# Patient Record
Sex: FEMALE | Race: WHITE | Employment: UNEMPLOYED | URBAN - METROPOLITAN AREA
[De-identification: names, ages, dates, MRNs, and addresses within clinical notes are randomized per-mention and may not be internally consistent; named-entity substitution may affect disease eponyms.]

---

## 2017-01-19 DIAGNOSIS — Z12.31 VISIT FOR SCREENING MAMMOGRAM: Primary | ICD-10-CM

## 2017-06-05 ENCOUNTER — OFFICE VISIT (OUTPATIENT)
Dept: INTERNAL MEDICINE | Age: 57
End: 2017-06-05

## 2017-06-05 VITALS
DIASTOLIC BLOOD PRESSURE: 82 MMHG | BODY MASS INDEX: 17.24 KG/M2 | HEIGHT: 60 IN | WEIGHT: 87.8 LBS | HEART RATE: 82 BPM | SYSTOLIC BLOOD PRESSURE: 198 MMHG

## 2017-06-05 DIAGNOSIS — I10 ESSENTIAL HYPERTENSION: ICD-10-CM

## 2017-06-05 PROCEDURE — 99213 OFFICE O/P EST LOW 20 MIN: CPT | Performed by: FAMILY MEDICINE

## 2017-06-05 RX ORDER — LISINOPRIL 20 MG/1
20 TABLET ORAL DAILY
Qty: 90 TABLET | Refills: 3 | Status: SHIPPED | OUTPATIENT
Start: 2017-06-05 | End: 2018-06-06 | Stop reason: SDUPTHER

## 2017-06-05 RX ORDER — METOPROLOL TARTRATE 50 MG/1
50 TABLET, FILM COATED ORAL DAILY
Qty: 90 TABLET | Refills: 3 | Status: SHIPPED | OUTPATIENT
Start: 2017-06-05 | End: 2018-06-06 | Stop reason: SDUPTHER

## 2017-06-05 ASSESSMENT — ENCOUNTER SYMPTOMS
COUGH: 1
WHEEZING: 0
SHORTNESS OF BREATH: 1
BACK PAIN: 0
CHEST TIGHTNESS: 0

## 2018-06-06 ENCOUNTER — OFFICE VISIT (OUTPATIENT)
Dept: INTERNAL MEDICINE | Age: 58
End: 2018-06-06

## 2018-06-06 VITALS
HEART RATE: 70 BPM | WEIGHT: 110 LBS | SYSTOLIC BLOOD PRESSURE: 154 MMHG | BODY MASS INDEX: 21.6 KG/M2 | DIASTOLIC BLOOD PRESSURE: 70 MMHG | HEIGHT: 60 IN | OXYGEN SATURATION: 97 %

## 2018-06-06 DIAGNOSIS — I10 ESSENTIAL HYPERTENSION: ICD-10-CM

## 2018-06-06 PROCEDURE — 99213 OFFICE O/P EST LOW 20 MIN: CPT | Performed by: FAMILY MEDICINE

## 2018-06-06 RX ORDER — LISINOPRIL 20 MG/1
20 TABLET ORAL DAILY
Qty: 30 TABLET | Refills: 0 | Status: SHIPPED | OUTPATIENT
Start: 2018-06-06 | End: 2018-06-25 | Stop reason: SDUPTHER

## 2018-06-06 RX ORDER — METOPROLOL TARTRATE 50 MG/1
50 TABLET, FILM COATED ORAL DAILY
Qty: 30 TABLET | Refills: 0 | Status: SHIPPED | OUTPATIENT
Start: 2018-06-06 | End: 2018-06-25 | Stop reason: SDUPTHER

## 2018-06-06 ASSESSMENT — ENCOUNTER SYMPTOMS
STRIDOR: 0
EYE REDNESS: 0
COUGH: 0
SHORTNESS OF BREATH: 0
CHOKING: 0
PHOTOPHOBIA: 0
EYE PAIN: 0
WHEEZING: 0

## 2018-06-06 ASSESSMENT — PATIENT HEALTH QUESTIONNAIRE - PHQ9
SUM OF ALL RESPONSES TO PHQ9 QUESTIONS 1 & 2: 1
2. FEELING DOWN, DEPRESSED OR HOPELESS: 1
SUM OF ALL RESPONSES TO PHQ QUESTIONS 1-9: 1
1. LITTLE INTEREST OR PLEASURE IN DOING THINGS: 0

## 2018-06-21 LAB
ALBUMIN SERPL-MCNC: 4.3 G/DL
ALP BLD-CCNC: 76 U/L
ALT SERPL-CCNC: 10 U/L
ANION GAP SERPL CALCULATED.3IONS-SCNC: NORMAL MMOL/L
AST SERPL-CCNC: 16 U/L
AVERAGE GLUCOSE: NORMAL
BILIRUB SERPL-MCNC: 0.7 MG/DL (ref 0.1–1.4)
BUN BLDV-MCNC: 13 MG/DL
CALCIUM SERPL-MCNC: 10 MG/DL
CHLORIDE BLD-SCNC: 95 MMOL/L
CHOLESTEROL, TOTAL: 228 MG/DL
CHOLESTEROL/HDL RATIO: ABNORMAL
CO2: 26 MMOL/L
CREAT SERPL-MCNC: 0.78 MG/DL
GFR CALCULATED: >60
GLUCOSE BLD-MCNC: NORMAL MG/DL
HBA1C MFR BLD: 6 %
HDLC SERPL-MCNC: 115 MG/DL (ref 35–70)
LDL CHOLESTEROL CALCULATED: 102 MG/DL (ref 0–160)
POTASSIUM SERPL-SCNC: 5.4 MMOL/L
SODIUM BLD-SCNC: 136 MMOL/L
TOTAL PROTEIN: 7.4
TRIGL SERPL-MCNC: 57 MG/DL
TSH SERPL DL<=0.05 MIU/L-ACNC: 0.25 UIU/ML
VLDLC SERPL CALC-MCNC: ABNORMAL MG/DL

## 2018-06-25 DIAGNOSIS — I10 ESSENTIAL HYPERTENSION: ICD-10-CM

## 2018-06-25 RX ORDER — LISINOPRIL 30 MG/1
30 TABLET ORAL DAILY
Qty: 90 TABLET | Refills: 2 | Status: SHIPPED | OUTPATIENT
Start: 2018-06-25 | End: 2019-03-25 | Stop reason: SDUPTHER

## 2018-06-25 RX ORDER — METOPROLOL TARTRATE 50 MG/1
50 TABLET, FILM COATED ORAL DAILY
Qty: 90 TABLET | Refills: 3 | Status: SHIPPED | OUTPATIENT
Start: 2018-06-25 | End: 2019-05-06 | Stop reason: SDUPTHER

## 2019-03-23 LAB
ALBUMIN SERPL-MCNC: 4.4 G/DL (ref 3.5–4.6)
ALP BLD-CCNC: 68 U/L (ref 40–130)
ALT SERPL-CCNC: 10 U/L (ref 0–33)
ANION GAP SERPL CALCULATED.3IONS-SCNC: 14 MEQ/L (ref 9–15)
AST SERPL-CCNC: 17 U/L (ref 0–35)
BILIRUB SERPL-MCNC: 0.5 MG/DL (ref 0.2–0.7)
BUN BLDV-MCNC: 12 MG/DL (ref 6–20)
CALCIUM SERPL-MCNC: 9.8 MG/DL (ref 8.5–9.9)
CHLORIDE BLD-SCNC: 99 MEQ/L (ref 95–107)
CHOLESTEROL, TOTAL: 221 MG/DL (ref 0–199)
CO2: 22 MEQ/L (ref 20–31)
CREAT SERPL-MCNC: 0.78 MG/DL (ref 0.5–0.9)
GFR AFRICAN AMERICAN: >60
GFR NON-AFRICAN AMERICAN: >60
GLOBULIN: 3.2 G/DL (ref 2.3–3.5)
GLUCOSE BLD-MCNC: 106 MG/DL (ref 70–99)
HDLC SERPL-MCNC: 100 MG/DL (ref 40–59)
LDL CHOLESTEROL CALCULATED: 111 MG/DL (ref 0–129)
POTASSIUM SERPL-SCNC: 5.5 MEQ/L (ref 3.4–4.9)
SODIUM BLD-SCNC: 135 MEQ/L (ref 135–144)
TOTAL PROTEIN: 7.6 G/DL (ref 6.3–8)
TRIGL SERPL-MCNC: 51 MG/DL (ref 0–150)

## 2019-03-25 DIAGNOSIS — I10 ESSENTIAL HYPERTENSION: ICD-10-CM

## 2019-03-25 PROBLEM — R73.03 PREDIABETES: Status: ACTIVE | Noted: 2019-03-25

## 2019-03-26 RX ORDER — LISINOPRIL 30 MG/1
30 TABLET ORAL DAILY
Qty: 30 TABLET | Refills: 3 | Status: SHIPPED | OUTPATIENT
Start: 2019-03-26 | End: 2019-05-06 | Stop reason: SDUPTHER

## 2019-03-27 LAB
ALBUMIN SERPL-MCNC: 4.4 G/DL
ALP BLD-CCNC: 68 U/L
ALT SERPL-CCNC: 10 U/L
ANION GAP SERPL CALCULATED.3IONS-SCNC: 14 MMOL/L
AST SERPL-CCNC: 17 U/L
AVERAGE GLUCOSE: 106
BILIRUB SERPL-MCNC: 0.5 MG/DL (ref 0.1–1.4)
BUN BLDV-MCNC: 12 MG/DL
CALCIUM SERPL-MCNC: 9.8 MG/DL
CHLORIDE BLD-SCNC: 99 MMOL/L
CHOLESTEROL, TOTAL: 221 MG/DL
CHOLESTEROL/HDL RATIO: ABNORMAL
CO2: 22 MMOL/L
CREAT SERPL-MCNC: 0.78 MG/DL
GFR CALCULATED: 60
GLUCOSE BLD-MCNC: 106 MG/DL
GLUCOSE BLD-MCNC: 106 MG/DL
HBA1C MFR BLD: 5.8 %
HDLC SERPL-MCNC: 100 MG/DL (ref 35–70)
LDL CHOLESTEROL CALCULATED: 111 MG/DL (ref 0–160)
POTASSIUM SERPL-SCNC: 5.5 MMOL/L
SODIUM BLD-SCNC: 135 MMOL/L
TOTAL PROTEIN: 7.6
TRIGL SERPL-MCNC: 51 MG/DL
VLDLC SERPL CALC-MCNC: ABNORMAL MG/DL

## 2019-04-03 ENCOUNTER — TELEPHONE (OUTPATIENT)
Dept: INTERNAL MEDICINE | Age: 59
End: 2019-04-03

## 2019-05-06 ENCOUNTER — OFFICE VISIT (OUTPATIENT)
Dept: INTERNAL MEDICINE | Age: 59
End: 2019-05-06

## 2019-05-06 VITALS
SYSTOLIC BLOOD PRESSURE: 158 MMHG | HEART RATE: 65 BPM | WEIGHT: 115 LBS | DIASTOLIC BLOOD PRESSURE: 54 MMHG | BODY MASS INDEX: 22.46 KG/M2 | OXYGEN SATURATION: 98 %

## 2019-05-06 DIAGNOSIS — Z12.11 ENCOUNTER FOR SCREENING COLONOSCOPY: ICD-10-CM

## 2019-05-06 DIAGNOSIS — E78.5 HYPERLIPIDEMIA, UNSPECIFIED HYPERLIPIDEMIA TYPE: ICD-10-CM

## 2019-05-06 DIAGNOSIS — F17.219 CIGARETTE NICOTINE DEPENDENCE WITH NICOTINE-INDUCED DISORDER: ICD-10-CM

## 2019-05-06 DIAGNOSIS — R73.03 PREDIABETES: Primary | ICD-10-CM

## 2019-05-06 DIAGNOSIS — Z12.39 SCREENING FOR BREAST CANCER: ICD-10-CM

## 2019-05-06 DIAGNOSIS — I10 ESSENTIAL HYPERTENSION: ICD-10-CM

## 2019-05-06 PROCEDURE — 99214 OFFICE O/P EST MOD 30 MIN: CPT | Performed by: FAMILY MEDICINE

## 2019-05-06 RX ORDER — METOPROLOL TARTRATE 50 MG/1
50 TABLET, FILM COATED ORAL DAILY
Qty: 90 TABLET | Refills: 3 | Status: SHIPPED | OUTPATIENT
Start: 2019-05-06 | End: 2020-05-04 | Stop reason: SDUPTHER

## 2019-05-06 RX ORDER — LISINOPRIL 40 MG/1
40 TABLET ORAL DAILY
Qty: 90 TABLET | Refills: 3 | Status: SHIPPED | OUTPATIENT
Start: 2019-05-06 | End: 2020-05-04 | Stop reason: SDUPTHER

## 2019-05-06 ASSESSMENT — PATIENT HEALTH QUESTIONNAIRE - PHQ9
SUM OF ALL RESPONSES TO PHQ QUESTIONS 1-9: 1
SUM OF ALL RESPONSES TO PHQ9 QUESTIONS 1 & 2: 1
SUM OF ALL RESPONSES TO PHQ QUESTIONS 1-9: 1
2. FEELING DOWN, DEPRESSED OR HOPELESS: 1
1. LITTLE INTEREST OR PLEASURE IN DOING THINGS: 0

## 2019-05-06 ASSESSMENT — ENCOUNTER SYMPTOMS
WHEEZING: 0
STRIDOR: 0
CHOKING: 0
EYE REDNESS: 0
SHORTNESS OF BREATH: 0
PHOTOPHOBIA: 0
COUGH: 0
EYE PAIN: 0

## 2019-05-06 NOTE — PROGRESS NOTES
Patient: Petar Jimenez    YOB: 1960    Date:5/6/19    Patient Active Problem List    Diagnosis Date Noted    Prediabetes 03/25/2019    HTN (hypertension) 03/04/2012    Hyperlipemia 03/04/2012    Nicotine dependence 03/04/2012     Past Medical History:   Diagnosis Date    Hypertension      No past surgical history on file. Social History     Socioeconomic History    Marital status:      Spouse name: Not on file    Number of children: Not on file    Years of education: Not on file    Highest education level: Not on file   Occupational History    Not on file   Social Needs    Financial resource strain: Not on file    Food insecurity:     Worry: Not on file     Inability: Not on file    Transportation needs:     Medical: Not on file     Non-medical: Not on file   Tobacco Use    Smoking status: Current Every Day Smoker     Packs/day: 0.75     Years: 30.00     Pack years: 22.50     Types: Cigarettes    Smokeless tobacco: Never Used   Substance and Sexual Activity    Alcohol use: No     Alcohol/week: 0.0 oz    Drug use: No    Sexual activity: Never   Lifestyle    Physical activity:     Days per week: Not on file     Minutes per session: Not on file    Stress: Not on file   Relationships    Social connections:     Talks on phone: Not on file     Gets together: Not on file     Attends Worship service: Not on file     Active member of club or organization: Not on file     Attends meetings of clubs or organizations: Not on file     Relationship status: Not on file    Intimate partner violence:     Fear of current or ex partner: Not on file     Emotionally abused: Not on file     Physically abused: Not on file     Forced sexual activity: Not on file   Other Topics Concern    Not on file   Social History Narrative    Not on file     No family history on file. No current outpatient medications on file prior to visit.      No current facility-administered medications on file prior to visit. No Known Allergies    Chief Complaint   Patient presents with    Hypertension     follow up    Hyperlipidemia       HPI:  Prediabetes  Lab Results   Component Value Date    LABA1C 5.8 03/27/2019     No results found for: EAG    Hypertension:  Home blood pressure monitoring: Yes - at times high. She is adherent to a low sodium diet. Patient denies chest pain and shortness of breath. Antihypertensive medication side effects: no medication side effects noted. Use of agents associated with hypertension: none. She is not taking her lisinopril. Lab Results   Component Value Date    LABA1C 5.8 03/27/2019    LABA1C 5.8 03/23/2019    LABA1C 6.0 (H) 06/10/2018     Lab Results   Component Value Date    LABMICR <1.20 06/16/2016    CREATININE 0.78 03/27/2019     Lab Results   Component Value Date    ALT 10 03/27/2019    AST 17 03/27/2019     Lab Results   Component Value Date    CHOL 221 03/27/2019    TRIG 51 03/27/2019     (A) 03/27/2019    LDLCALC 111 03/27/2019        Diabetes and Hypertension Visit Information    BP Readings from Last 3 Encounters:   05/06/19 (!) 158/54   06/06/18 (!) 154/70   06/05/17 (!) 198/82          Hemoglobin A1C (%)   Date Value   03/27/2019 5.8   03/23/2019 5.8   06/10/2018 6.0 (H)     Microalbumin, Random Urine (mg/dL)   Date Value   06/16/2016 <1.20     LDL Calculated (mg/dL)   Date Value   03/27/2019 111     HDL (mg/dL)   Date Value   03/27/2019 100 (A)     BUN (mg/dL)   Date Value   03/27/2019 12     CREATININE (no units)   Date Value   03/27/2019 0.78     Glucose (mg/dL)   Date Value   03/27/2019 106   03/27/2019 106              Have you seen any other physician or provider since your last visit? yes  Have you had any other diagnostic tests since your last visit? yes  Have you been seen in the emergency room and/or had an admission to a hospital since we last saw you?  No      Patient Care Team:  Olga Mcfarland MD as PCP - General (Family Medicine) Health Maintenance   Topic Date Due    Hepatitis C screen  1960    HIV screen  03/19/1975    DTaP/Tdap/Td vaccine (1 - Tdap) 03/19/1979    Cervical cancer screen  09/21/1999    Colon cancer screen colonoscopy  03/19/2010    Breast cancer screen  06/20/2014    Shingles Vaccine (1 of 2) 05/06/2020 (Originally 3/19/2010)    Flu vaccine (Season Ended) 09/01/2019    A1C test (Diabetic or Prediabetic)  03/27/2020    Potassium monitoring  03/27/2020    Creatinine monitoring  03/27/2020    Lipid screen  03/27/2024    Pneumococcal 0-64 years Vaccine  Completed         Review of Systems   Constitutional: Negative for diaphoresis, fatigue, fever and unexpected weight change. Eyes: Negative for photophobia, pain, redness and visual disturbance. Respiratory: Negative for cough, choking, shortness of breath, wheezing and stridor. Cardiovascular: Negative for chest pain, palpitations and leg swelling. Physical Exam  Vitals:    05/06/19 1451   BP: (!) 158/54   Pulse:    SpO2:    Body mass index is 22.46 kg/m². Physical Exam  Constitutional:  Appears well-developed and well-nourished. No distress. HENT:   Head: Normocephalic and atraumatic. Right Ear: External ear normal.   Left Ear: External ear normal.   Nose: Nose normal.   Eyes: Conjunctivae and EOM are normal.  Right eye exhibits no discharge. Left eye exhibits no discharge. No scleral icterus. Neck: Normal range of motion. Cardiovascular: Normal rate, regular rhythm and normal heart sounds. No murmur heard. Pulmonary/Chest: Effort normal and breath sounds normal. No respiratory distress. no wheezes. no rales. no tenderness. Musculoskeletal: Normal range of motion. Neurological: alert. Skin: not diaphoretic. Psychiatric: normal mood and affect. behavior is normal. Judgment and thought content normal.   Nursing note and vitals reviewed. Assessment:  Alena Lees was seen today for hypertension and hyperlipidemia.     Diagnoses and all orders for this visit:    Prediabetes  Stable    Essential hypertension  -     lisinopril (PRINIVIL;ZESTRIL) 40 MG tablet; Take 1 tablet by mouth daily  -     metoprolol tartrate (LOPRESSOR) 50 MG tablet; Take 1 tablet by mouth daily  Increase dose of ACE  Keep BP log at home    Cigarette nicotine dependence with nicotine-induced disorder  I advised she stop smoking and counseled her on the bad effects of tobacco     Hyperlipidemia, unspecified hyperlipidemia type  Stable, controlled  Plan: continue current medications    Screening for breast cancer  -     [facility] DIGITAL SCREEN W OR WO CAD BILATERAL; Future    Encounter for screening colonoscopy  -     COLOGUARD; Future    She does not have insurance and can not afford all testing and preventative care measures  Dicussed cost and pt assistance for mammogram and colon cancer screening  Labs with [facility] nursing     I personally reviewed all recent labs and imaging pertaining to conditions mentioned above in assessment/plan in [facility] and care everywhere, discussed results with patient in office    HTN / Hyperlipidemia Counseling  Patient was counseled regarding disease risks and adopting healthy behaviors. Patient was provided education materials (or meal plan) to assist with self management. Patient was provided log (or received log during previous visit) to record blood pressure and/or food intake. Patient was instructed to keep log up-to-date and to always bring log to all office visits. Reviewed the following concerns and recommendations with the patient:    Lifestyle modifications in the management of hypertension:  1. Weight reduction    -Maintain normal body weight (BMI, 18.5 to 24.9 kg/m2)  2. Adopt DASH eating plan    - Consume a diet rich in fruits, vegetables, and low-fat dairy products with a  reduced content of saturated and total fat   3.  Dietary sodium reduction    - Reduce dietary sodium intake to no more than 100 meq/day (2.4 g sodium or 6  g sodium chloride)   4. Physical activity    - Engage in regular aerobic physical activity such as brisk walking (at least 30  minutes per day, most days of the week)   5. Moderation of alcohol consumption    - Limit consumption to no more than 2 drinks per day in most men and no more  than 1 drink per day in women and lighter-weight persons     Return in about 1 year (around 5/6/2020).

## 2019-05-14 ENCOUNTER — HOSPITAL ENCOUNTER (OUTPATIENT)
Dept: WOMENS IMAGING | Age: 59
Discharge: HOME | End: 2019-05-16
Payer: COMMERCIAL

## 2019-05-14 DIAGNOSIS — Z12.39 SCREENING FOR BREAST CANCER: ICD-10-CM

## 2019-05-14 PROCEDURE — 77067 SCR MAMMO BI INCL CAD: CPT

## 2020-05-04 ENCOUNTER — VIRTUAL VISIT (OUTPATIENT)
Dept: INTERNAL MEDICINE | Age: 60
End: 2020-05-04

## 2020-05-04 VITALS
HEIGHT: 60 IN | WEIGHT: 115 LBS | DIASTOLIC BLOOD PRESSURE: 78 MMHG | HEART RATE: 70 BPM | SYSTOLIC BLOOD PRESSURE: 135 MMHG | BODY MASS INDEX: 22.58 KG/M2

## 2020-05-04 PROCEDURE — 99442 PR PHYS/QHP TELEPHONE EVALUATION 11-20 MIN: CPT | Performed by: FAMILY MEDICINE

## 2020-05-04 RX ORDER — LISINOPRIL 40 MG/1
40 TABLET ORAL DAILY
Qty: 90 TABLET | Refills: 2 | Status: SHIPPED | OUTPATIENT
Start: 2020-05-04

## 2020-05-04 RX ORDER — METOPROLOL TARTRATE 50 MG/1
50 TABLET, FILM COATED ORAL DAILY
Qty: 90 TABLET | Refills: 2 | Status: SHIPPED | OUTPATIENT
Start: 2020-05-04

## 2020-05-04 ASSESSMENT — ENCOUNTER SYMPTOMS
PHOTOPHOBIA: 0
EYE PAIN: 0
COUGH: 0
WHEEZING: 0
STRIDOR: 0
CHOKING: 0
EYE REDNESS: 0
SHORTNESS OF BREATH: 0

## 2020-05-04 NOTE — PATIENT INSTRUCTIONS
Hypertension / Hyperlipidemia Management    Blood Pressure Log      Tips to manage your condition    1. Keep your blood pressure below 130/80   Make sure your blood pressure is measured at every office visit    2. If you take antihypertensive drug therapy return for follow-up monthly until B/P goal is reached. 3. Follow-up with your physician to have your potassium and creatinine measured every 6 months. 4. Measure your blood pressure at home (use log to track your progress). 5. Keep your LDL (bad) cholesterol level below 130   Make sure your lipids are measured once a year     6. If you take LDL-lowering drug therapy return for follow-up every 6 months    Tips for healthy living    1. Start your day with breakfast  2. Exercise and slowly progress to (brisk walking, bike riding, etc) 30 minutes 3 to 5 days a week. 3. Snack in moderation (limit eating sugary or salty foods to no more than 3 times a week). 4. Eat more grains and vegetables (have no more than 3 servings of fruit daily). 5. Avoid tobacco use. 6. Drink alcohol in moderation (no more than 1 serving daily for woman and no more than 2 servings daily for men)  7. Obtain annual flu shot  8. Refer to patient education handouts given to you today. [facility] Address Phone Website   [facility]  [address] [place] [address] [phone] [email or web address] [email or web address]       Weight Management Community Resources   Organization Address Phone Website   [facility] ([facility]) [address] [facility], [address] [facility] [phone] n/a   [employer] Multiple meeting locations throughout [address] [place], [place] [address] [email or web address]     Tops n/a n/a [email or web address]. [email or web address] [address] [facility] Artist Gloria, [address] [phone] [email or web address]    [facility] [address] [place] [address] [facility] [phone]

## 2020-05-04 NOTE — PROGRESS NOTES
Results   Component Value Date    ALT 10 03/27/2019    AST 17 03/27/2019     Lab Results   Component Value Date    CHOL 221 03/27/2019    TRIG 51 03/27/2019     (A) 03/27/2019    LDLCALC 111 03/27/2019        Diabetes and Hypertension Visit Information    BP Readings from Last 3 Encounters:   05/06/19 (!) 158/54   06/06/18 (!) 154/70   06/05/17 (!) 198/82          Hemoglobin A1C (%)   Date Value   03/27/2019 5.8   03/23/2019 5.8   06/10/2018 6.0 (H)     Microalbumin, Random Urine (mg/dL)   Date Value   06/16/2016 <1.20     LDL Calculated (mg/dL)   Date Value   03/27/2019 111     HDL (mg/dL)   Date Value   03/27/2019 100 (A)     BUN (mg/dL)   Date Value   03/27/2019 12     CREATININE (no units)   Date Value   03/27/2019 0.78     Glucose (mg/dL)   Date Value   03/27/2019 106   03/27/2019 106              Have you seen any other physician or provider since your last visit? no  Have you had any other diagnostic tests since your last visit? no  Have you been seen in the emergency room and/or had an admission to a hospital since we last saw you?  No      Patient Care Team:  Kym Ramos MD as PCP - General (Family Medicine)  Kym Ramos MD as PCP - [facility]         Health Maintenance   Topic Date Due    Hepatitis C screen  1960    HIV screen  03/19/1975    DTaP/Tdap/Td vaccine (1 - Tdap) 03/19/1979    Cervical cancer screen  09/21/1999    Colon cancer screen colonoscopy  03/19/2010    A1C test (Diabetic or Prediabetic)  03/27/2020    Potassium monitoring  03/27/2020    Creatinine monitoring  03/27/2020    Shingles Vaccine (1 of 2) 05/06/2020 (Originally 3/19/2010)    Flu vaccine (Season Ended) 09/01/2020    Breast cancer screen  05/14/2021    Lipid screen  03/27/2024    Pneumococcal 0-64 years Vaccine  Completed    Hepatitis A vaccine  Aged Out    Hepatitis B vaccine  Aged Out    Hib vaccine  Aged Out    Meningococcal (ACWY) vaccine  Aged Out       Review of Systems

## 2020-08-25 ENCOUNTER — TELEPHONE (OUTPATIENT)
Dept: INTERNAL MEDICINE | Age: 60
End: 2020-08-25

## 2020-12-03 ENCOUNTER — TELEPHONE (OUTPATIENT)
Dept: FAMILY MEDICINE CLINIC | Age: 60
End: 2020-12-03

## 2020-12-11 ENCOUNTER — TELEPHONE (OUTPATIENT)
Dept: INTERNAL MEDICINE | Age: 60
End: 2020-12-11

## 2021-02-17 ENCOUNTER — TELEPHONE (OUTPATIENT)
Dept: INTERNAL MEDICINE | Age: 61
End: 2021-02-17

## 2021-05-13 ENCOUNTER — TELEPHONE (OUTPATIENT)
Dept: INTERNAL MEDICINE | Age: 61
End: 2021-05-13